# Patient Record
Sex: MALE | Race: WHITE
[De-identification: names, ages, dates, MRNs, and addresses within clinical notes are randomized per-mention and may not be internally consistent; named-entity substitution may affect disease eponyms.]

---

## 2018-02-27 ENCOUNTER — TRANSCRIPTION ENCOUNTER (OUTPATIENT)
Age: 43
End: 2018-02-27

## 2019-11-12 ENCOUNTER — TRANSCRIPTION ENCOUNTER (OUTPATIENT)
Age: 44
End: 2019-11-12

## 2020-05-11 PROBLEM — Z00.00 ENCOUNTER FOR PREVENTIVE HEALTH EXAMINATION: Status: ACTIVE | Noted: 2020-05-11

## 2020-05-18 ENCOUNTER — APPOINTMENT (OUTPATIENT)
Dept: ALLERGY | Facility: CLINIC | Age: 45
End: 2020-05-18
Payer: MEDICAID

## 2020-05-18 VITALS
RESPIRATION RATE: 16 BRPM | TEMPERATURE: 98.1 F | HEIGHT: 60.5 IN | BODY MASS INDEX: 43.99 KG/M2 | DIASTOLIC BLOOD PRESSURE: 70 MMHG | WEIGHT: 230 LBS | OXYGEN SATURATION: 98 % | HEART RATE: 79 BPM | SYSTOLIC BLOOD PRESSURE: 110 MMHG

## 2020-05-18 DIAGNOSIS — Z11.59 ENCOUNTER FOR SCREENING FOR OTHER VIRAL DISEASES: ICD-10-CM

## 2020-05-18 PROCEDURE — 99204 OFFICE O/P NEW MOD 45 MIN: CPT

## 2020-05-18 NOTE — HISTORY OF PRESENT ILLNESS
[Eczematous rashes] : eczematous rashes [Venom Reactions] : venom reactions [Food Allergies] : food allergies [de-identified] : Patient was treated with allergy injections for around 8 years and discontinued the injections about 1 year ago.  Patient starting to have ocular itching - sneezing - dry eyes - loud sneezing - slight cough with SOB.   He has a history of asthma during childhood but not as an adult.  Patient is taking a prn Zyrtec presently. \par \par Patient with migraine headaches - worsens with changes of temperatures.

## 2020-05-18 NOTE — ASSESSMENT
[FreeTextEntry1] : Seasonal allergic rhinoconjunctivitis:\par \par Pataday QD\par Allegra 180 mg QD prn \par Azelastine BID\par \par Mild intermittent asthma:\par \par Ventolin 2 puffs QID prn \par \par RV 1 year or prn

## 2020-05-18 NOTE — PHYSICAL EXAM
[Alert] : alert [Well Nourished] : well nourished [Healthy Appearance] : healthy appearance [No Acute Distress] : no acute distress [Well Developed] : well developed [No Discharge] : no discharge [No Photophobia] : no photophobia [Normal Pupil & Iris Size/Symmetry] : normal pupil and iris size and symmetry [Sclera Not Icteric] : sclera not icteric [Normal Nasal Mucosa] : the nasal mucosa was normal [Normal Lips/Tongue] : the lips and tongue were normal [No Thrush] : no thrush [Normal Tonsils] : normal tonsils [Normal Dentition] : normal dentition [No Neck Mass] : no neck mass was observed [No Oral Lesions or Ulcers] : no oral lesions or ulcers [Supple] : the neck was supple [No LAD] : no lymphadenopathy [No Thyroid Mass] : no thyroid mass [Normal Rate and Effort] : normal respiratory rhythm and effort [No Retractions] : no retractions [No Crackles] : no crackles [Bilateral Audible Breath Sounds] : bilateral audible breath sounds [No murmur] : no murmur [Normal S1, S2] : normal S1 and S2 [Normal Rate] : heart rate was normal  [Regular Rhythm] : with a regular rhythm [Normal Cervical Lymph Nodes] : cervical [Skin Intact] : skin intact  [No Rash] : no rash [No Joint Swelling or Erythema] : no joint swelling or erythema [No Skin Lesions] : no skin lesions [No clubbing] : no clubbing [No Edema] : no edema [No Cyanosis] : no cyanosis [Normal Mood] : mood was normal [Normal Affect] : affect was normal [Alert, Awake, Oriented as Age-Appropriate] : alert, awake, oriented as age appropriate

## 2020-05-18 NOTE — SOCIAL HISTORY
[Spouse/Partner] : spouse/partner [House] : [unfilled] lives in a house  [Central Forced Air] : heating provided by central forced air [Central] : air conditioning provided by central unit [Bedroom] :  in bedroom [Basement] :  in basement  [] :  [Dog] : dog [de-identified] : 3 children  [FreeTextEntry2] :   [Living Area] : not in the living area [Smokers in Household] : there are no smokers in the home [de-identified] : nonshedding

## 2020-05-19 LAB
SARS-COV-2 IGG SERPL IA-ACNC: 0.1 INDEX
SARS-COV-2 IGG SERPL QL IA: NEGATIVE

## 2020-06-10 ENCOUNTER — TRANSCRIPTION ENCOUNTER (OUTPATIENT)
Age: 45
End: 2020-06-10

## 2021-03-11 ENCOUNTER — APPOINTMENT (OUTPATIENT)
Dept: ALLERGY | Facility: CLINIC | Age: 46
End: 2021-03-11
Payer: MEDICAID

## 2021-03-11 VITALS
RESPIRATION RATE: 16 BRPM | OXYGEN SATURATION: 98 % | SYSTOLIC BLOOD PRESSURE: 121 MMHG | BODY MASS INDEX: 42.08 KG/M2 | TEMPERATURE: 98 F | HEART RATE: 76 BPM | HEIGHT: 60.5 IN | DIASTOLIC BLOOD PRESSURE: 76 MMHG | WEIGHT: 220 LBS

## 2021-03-11 PROCEDURE — 99072 ADDL SUPL MATRL&STAF TM PHE: CPT

## 2021-03-11 PROCEDURE — 99213 OFFICE O/P EST LOW 20 MIN: CPT

## 2021-03-11 RX ORDER — AZELASTINE HYDROCHLORIDE 137 UG/1
137 SPRAY, METERED NASAL
Qty: 1 | Refills: 1 | Status: ACTIVE | COMMUNITY
Start: 2020-05-18 | End: 1900-01-01

## 2021-03-11 NOTE — HISTORY OF PRESENT ILLNESS
[Eczematous rashes] : eczematous rashes [Venom Reactions] : venom reactions [Food Allergies] : food allergies [de-identified] : Last year he was treated with Pataday, azelastine, Allegra and Ventolin - he did well with this regimen.   He is being seen today for preparation for upcoming allergy season.

## 2021-03-11 NOTE — ASSESSMENT
[FreeTextEntry1] : Seasonal allergic rhinoconjunctivitis:\par \par Allegra 180 mg QAM\par Pataday QAM\par Azelastine BID \par \par Mild intermittent asthma:\par \par Proair 2 puffs QID prn \par \par RV 1 year or prn \par \par This visit was for 20 minutes with 80% of the time devoted to face-to-face counseling and coordination of care and 20% of the time devoted to review of medical records/lab results/ordering labs and other tests/speaking to patient and family members/writing the note.\par

## 2021-03-11 NOTE — SOCIAL HISTORY
[Spouse/Partner] : spouse/partner [de-identified] : 3 children  [FreeTextEntry2] :   [House] : [unfilled] lives in a house  [Central Forced Air] : heating provided by central forced air [Central] : air conditioning provided by central unit [Bedroom] :  in bedroom [Basement] :  in basement  [Living Area] : not in the living area [Dog] : dog [] :  [Smokers in Household] : there are no smokers in the home [de-identified] : nonshedding

## 2021-03-11 NOTE — PHYSICAL EXAM
[Alert] : alert [Well Nourished] : well nourished [Healthy Appearance] : healthy appearance [No Acute Distress] : no acute distress [Well Developed] : well developed [Normal Voice/Communication] : normal voice communication [Boggy Nasal Turbinates] : no boggy and/or pale nasal turbinates [No Neck Mass] : no neck mass was observed [No LAD] : no lymphadenopathy [No Thyroid Mass] : no thyroid mass [Supple] : the neck was supple [Normal Rate and Effort] : normal respiratory rhythm and effort [No Crackles] : no crackles [Bilateral Audible Breath Sounds] : bilateral audible breath sounds [Wheezing] : no wheezing was heard [Normal Rate] : heart rate was normal  [Normal S1, S2] : normal S1 and S2 [No murmur] : no murmur [Regular Rhythm] : with a regular rhythm [Normal Cervical Lymph Nodes] : cervical [Skin Intact] : skin intact  [No clubbing] : no clubbing [No Cyanosis] : no cyanosis [Normal Mood] : mood was normal [Normal Affect] : affect was normal

## 2022-03-15 ENCOUNTER — APPOINTMENT (OUTPATIENT)
Dept: ALLERGY | Facility: CLINIC | Age: 47
End: 2022-03-15
Payer: MEDICAID

## 2022-03-15 VITALS
TEMPERATURE: 97.7 F | HEIGHT: 65 IN | RESPIRATION RATE: 16 BRPM | HEART RATE: 67 BPM | BODY MASS INDEX: 39.99 KG/M2 | DIASTOLIC BLOOD PRESSURE: 77 MMHG | SYSTOLIC BLOOD PRESSURE: 120 MMHG | WEIGHT: 240 LBS | OXYGEN SATURATION: 95 %

## 2022-03-15 PROCEDURE — 99214 OFFICE O/P EST MOD 30 MIN: CPT | Mod: 25

## 2022-03-15 RX ORDER — ALBUTEROL SULFATE 90 UG/1
108 (90 BASE) AEROSOL, METERED RESPIRATORY (INHALATION)
Qty: 1 | Refills: 1 | Status: ACTIVE | COMMUNITY
Start: 2020-05-18 | End: 1900-01-01

## 2022-03-15 RX ORDER — OLOPATADINE HYDROCHLORIDE 2 MG/ML
0.2 SOLUTION OPHTHALMIC DAILY
Qty: 1 | Refills: 2 | Status: ACTIVE | COMMUNITY
Start: 2020-05-18 | End: 1900-01-01

## 2022-03-15 NOTE — HISTORY OF PRESENT ILLNESS
[de-identified] : Patient is noncompliant with medications and he does well when he takes the medications - he does not like the azelastine nasal spray

## 2022-03-15 NOTE — SOCIAL HISTORY
[Spouse/Partner] : spouse/partner [House] : [unfilled] lives in a house  [Central Forced Air] : heating provided by central forced air [Central] : air conditioning provided by central unit [Bedroom] :  in bedroom [Basement] :  in basement  [Dog] : dog [] :  [de-identified] : 3 children  [FreeTextEntry2] :   [Living Area] : not in the living area [Smokers in Household] : there are no smokers in the home [de-identified] : nonshedding

## 2022-03-15 NOTE — ASSESSMENT
[FreeTextEntry1] : Seasonal allergic rhinoconjunctivitis:\par \par Allegra 180 mg QAM\par Pataday QAM\par Flonase QD\par Singulair QD  \par \par Mild intermittent asthma:\par \par Proair 2 puffs QID prn \par \par RV 1 year or prn \par \par

## 2022-03-15 NOTE — PHYSICAL EXAM
[Alert] : alert [No Acute Distress] : no acute distress [No Neck Mass] : no neck mass was observed [No LAD] : no lymphadenopathy [No Thyroid Mass] : no thyroid mass [Supple] : the neck was supple [Normal Rate and Effort] : normal respiratory rhythm and effort [No Crackles] : no crackles [No Retractions] : no retractions [Normal Rate] : heart rate was normal  [Normal S1, S2] : normal S1 and S2 [No murmur] : no murmur [Regular Rhythm] : with a regular rhythm [Normal Cervical Lymph Nodes] : cervical [No Rash] : no rash [Normal Mood] : mood was normal [Normal Affect] : affect was normal [Judgment and Insight Age Appropriate] : judgement and insight is age appropriate [Alert, Awake, Oriented as Age-Appropriate] : alert, awake, oriented as age appropriate [Wheezing] : no wheezing was heard [de-identified] : obese male

## 2022-04-11 PROBLEM — Z11.59 SCREENING FOR VIRAL DISEASE: Status: ACTIVE | Noted: 2020-05-18

## 2022-05-17 ENCOUNTER — NON-APPOINTMENT (OUTPATIENT)
Age: 47
End: 2022-05-17

## 2022-08-01 ENCOUNTER — RX RENEWAL (OUTPATIENT)
Age: 47
End: 2022-08-01

## 2023-07-12 ENCOUNTER — NON-APPOINTMENT (OUTPATIENT)
Age: 48
End: 2023-07-12

## 2024-03-12 ENCOUNTER — APPOINTMENT (OUTPATIENT)
Dept: ALLERGY | Facility: CLINIC | Age: 49
End: 2024-03-12
Payer: MEDICAID

## 2024-03-12 VITALS
OXYGEN SATURATION: 96 % | TEMPERATURE: 98.3 F | SYSTOLIC BLOOD PRESSURE: 112 MMHG | HEART RATE: 96 BPM | DIASTOLIC BLOOD PRESSURE: 78 MMHG

## 2024-03-12 PROCEDURE — 95004 PERQ TESTS W/ALRGNC XTRCS: CPT

## 2024-03-12 PROCEDURE — 99214 OFFICE O/P EST MOD 30 MIN: CPT | Mod: 25

## 2024-03-12 RX ORDER — FLUTICASONE PROPIONATE 50 UG/1
50 SPRAY, METERED NASAL DAILY
Qty: 1 | Refills: 5 | Status: ACTIVE | COMMUNITY
Start: 2022-03-15 | End: 1900-01-01

## 2024-03-12 RX ORDER — MONTELUKAST 10 MG/1
10 TABLET, FILM COATED ORAL DAILY
Qty: 90 | Refills: 2 | Status: ACTIVE | COMMUNITY
Start: 2022-03-15 | End: 1900-01-01

## 2024-03-12 RX ORDER — ALBUTEROL SULFATE 90 UG/1
108 (90 BASE) INHALANT RESPIRATORY (INHALATION)
Qty: 1 | Refills: 1 | Status: ACTIVE | COMMUNITY
Start: 2022-08-01 | End: 1900-01-01

## 2024-03-12 RX ORDER — KETOTIFEN FUMARATE 0.25 MG/ML
0.03 SOLUTION/ DROPS OPHTHALMIC
Qty: 1 | Refills: 3 | Status: ACTIVE | COMMUNITY
Start: 2024-03-12 | End: 1900-01-01

## 2024-03-12 NOTE — ASSESSMENT
[FreeTextEntry1] : Perennial allergic rhinoconjunctivitis:  Singulair 10 mg QD Flonase 2 puffs QD Zyrtec 10 mg QD  Ketotifen TID eye drops   Mild intermittent asthma:  Albuterol 2 puffs QID prn

## 2024-03-12 NOTE — PHYSICAL EXAM
[Alert] : alert [Well Nourished] : well nourished [Well Developed] : well developed [No Acute Distress] : no acute distress [Normal Nasal Mucosa] : the nasal mucosa was normal [No Neck Mass] : no neck mass was observed [No LAD] : no lymphadenopathy [No Thyroid Mass] : no thyroid mass [No Crackles] : no crackles [Normal Rate and Effort] : normal respiratory rhythm and effort [No Retractions] : no retractions [Bilateral Audible Breath Sounds] : bilateral audible breath sounds [Normal Rate] : heart rate was normal  [Normal S1, S2] : normal S1 and S2 [Regular Rhythm] : with a regular rhythm [No murmur] : no murmur [Normal Cervical Lymph Nodes] : cervical [Skin Intact] : skin intact  [No Rash] : no rash [Normal Affect] : affect was normal [Normal Mood] : mood was normal [Boggy Nasal Turbinates] : no boggy and/or pale nasal turbinates [Wheezing] : no wheezing was heard

## 2024-03-12 NOTE — HISTORY OF PRESENT ILLNESS
[de-identified] : Patient was seen 2 years ago - he was treated with Allegra 180 mg QD - Pataday - Flonase and Singulair -

## 2024-04-24 ENCOUNTER — NON-APPOINTMENT (OUTPATIENT)
Age: 49
End: 2024-04-24

## 2024-04-28 ENCOUNTER — NON-APPOINTMENT (OUTPATIENT)
Age: 49
End: 2024-04-28

## 2024-06-05 ENCOUNTER — APPOINTMENT (OUTPATIENT)
Dept: PULMONOLOGY | Facility: CLINIC | Age: 49
End: 2024-06-05
Payer: COMMERCIAL

## 2024-06-05 VITALS
OXYGEN SATURATION: 95 % | BODY MASS INDEX: 38.32 KG/M2 | SYSTOLIC BLOOD PRESSURE: 105 MMHG | WEIGHT: 230 LBS | HEART RATE: 72 BPM | RESPIRATION RATE: 16 BRPM | DIASTOLIC BLOOD PRESSURE: 73 MMHG | HEIGHT: 65 IN

## 2024-06-05 DIAGNOSIS — R06.83 SNORING: ICD-10-CM

## 2024-06-05 DIAGNOSIS — R91.1 SOLITARY PULMONARY NODULE: ICD-10-CM

## 2024-06-05 DIAGNOSIS — H10.10 ACUTE ATOPIC CONJUNCTIVITIS, UNSPECIFIED EYE: ICD-10-CM

## 2024-06-05 PROCEDURE — ZZZZZ: CPT

## 2024-06-05 PROCEDURE — 99204 OFFICE O/P NEW MOD 45 MIN: CPT | Mod: 25

## 2024-06-05 PROCEDURE — 94010 BREATHING CAPACITY TEST: CPT

## 2024-06-05 PROCEDURE — 94727 GAS DIL/WSHOT DETER LNG VOL: CPT

## 2024-06-05 PROCEDURE — 94729 DIFFUSING CAPACITY: CPT

## 2024-06-05 NOTE — HISTORY OF PRESENT ILLNESS
[Never] : never [TextBox_4] : 48M with obesity  was having chest pains, PCP sent for ct which showed 2mm nodule and a small granuloma.  Cardiac work up was normal.  He will be seeing gastro next.  He denies any pulmonary history.  He has seasonal allergies.  No sob/cough/wheeze.  nonsmoker, no cancer hx. reports snoring at night, never tested for josé.

## 2024-06-05 NOTE — PROCEDURE
[FreeTextEntry1] : PFT: Normal spirometry.  Lung volumes normal. DLCO normal.  CT from [facility] on his phone reviewed 2mm nodule, granuloma

## 2024-06-05 NOTE — ASSESSMENT
[FreeTextEntry1] : per guidelines the small nodule does not require follow up  we discussed josé testing given his snoring/obesity.  He would like to try to lose weight first, he will think about it   fu if he decides to move forward with sleep testing.

## 2024-10-07 ENCOUNTER — NON-APPOINTMENT (OUTPATIENT)
Age: 49
End: 2024-10-07

## 2025-05-08 ENCOUNTER — NON-APPOINTMENT (OUTPATIENT)
Age: 50
End: 2025-05-08

## 2025-05-08 ENCOUNTER — APPOINTMENT (OUTPATIENT)
Dept: ALLERGY | Facility: CLINIC | Age: 50
End: 2025-05-08
Payer: COMMERCIAL

## 2025-05-08 VITALS
TEMPERATURE: 97.8 F | HEART RATE: 99 BPM | RESPIRATION RATE: 14 BRPM | DIASTOLIC BLOOD PRESSURE: 70 MMHG | SYSTOLIC BLOOD PRESSURE: 122 MMHG | OXYGEN SATURATION: 97 %

## 2025-05-08 PROCEDURE — 99213 OFFICE O/P EST LOW 20 MIN: CPT

## 2025-09-10 ENCOUNTER — NON-APPOINTMENT (OUTPATIENT)
Age: 50
End: 2025-09-10